# Patient Record
Sex: MALE | Race: BLACK OR AFRICAN AMERICAN | Employment: FULL TIME | URBAN - METROPOLITAN AREA
[De-identification: names, ages, dates, MRNs, and addresses within clinical notes are randomized per-mention and may not be internally consistent; named-entity substitution may affect disease eponyms.]

---

## 2018-06-21 ENCOUNTER — HOSPITAL ENCOUNTER (OUTPATIENT)
Dept: NEUROLOGY | Age: 63
Discharge: HOME | End: 2018-06-21
Attending: FAMILY MEDICINE | Admitting: FAMILY MEDICINE

## 2018-06-21 DIAGNOSIS — G62.9 POLYNEUROPATHY: ICD-10-CM

## 2020-08-10 ENCOUNTER — OFFICE VISIT (OUTPATIENT)
Dept: ORTHOPEDIC SURGERY | Age: 65
End: 2020-08-10
Payer: COMMERCIAL

## 2020-08-10 VITALS
BODY MASS INDEX: 30.8 KG/M2 | HEIGHT: 71 IN | RESPIRATION RATE: 16 BRPM | HEART RATE: 76 BPM | OXYGEN SATURATION: 94 % | WEIGHT: 220 LBS

## 2020-08-10 PROCEDURE — 99242 OFF/OP CONSLTJ NEW/EST SF 20: CPT | Performed by: PHYSICIAN ASSISTANT

## 2020-08-10 PROCEDURE — G8417 CALC BMI ABV UP PARAM F/U: HCPCS | Performed by: PHYSICIAN ASSISTANT

## 2020-08-10 PROCEDURE — G8427 DOCREV CUR MEDS BY ELIG CLIN: HCPCS | Performed by: PHYSICIAN ASSISTANT

## 2020-08-10 ASSESSMENT — ENCOUNTER SYMPTOMS
EYES NEGATIVE: 1
ALLERGIC/IMMUNOLOGIC NEGATIVE: 1
RESPIRATORY NEGATIVE: 1
SHORTNESS OF BREATH: 0
GASTROINTESTINAL NEGATIVE: 1

## 2020-08-10 NOTE — PROGRESS NOTES
Pushpa Gonzalez is a 63year old male. Patient is in the office today with left shoulder pain. Patient states that he injured themselves by probably lifting something heavy. Patient states the pulling feeling in the shoulder 4 months. Helps the most none. Helps the least none. Previous treatment physical therapy with no relief. Previous Injuries none. Previous surgeries none. Pain scale  0/10. 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  Patient works on [employer]

## 2020-08-10 NOTE — PROGRESS NOTES
Cancer Father         Unsure What Kind Of Cancer    Diabetes Sister        Social History     Socioeconomic History    Marital status:      Spouse name: None    Number of children: None    Years of education: None    Highest education level: None   Occupational History    None   Social Needs    Financial resource strain: None    Food insecurity     Worry: None     Inability: None    Transportation needs     Medical: None     Non-medical: None   Tobacco Use    Smoking status: Never Smoker    Smokeless tobacco: Never Used   Substance and Sexual Activity    Alcohol use: No    Drug use: No    Sexual activity: Yes     Partners: Female   Lifestyle    Physical activity     Days per week: None     Minutes per session: None    Stress: None   Relationships    Social connections     Talks on phone: None     Gets together: None     Attends [employer] service: None     Active member of club or organization: None     Attends meetings of clubs or organizations: None     Relationship status: None    Intimate partner violence     Fear of current or ex partner: None     Emotionally abused: None     Physically abused: None     Forced sexual activity: None   Other Topics Concern    None   Social History Narrative    None       Current Outpatient Medications   Medication Sig Dispense Refill    Multiple Vitamins-Minerals (MULTIVITAMIN PO) Take  by mouth every morning. Over The Counter      UNABLE TO FIND Take  by mouth every morning. Over The Counter Protein Supplement      GINKGO BILOBA PO Take  by mouth every other day. Over The Counter      GINSENG PO Take  by mouth every morning. Over The Counter       No current facility-administered medications for this visit. No Known Allergies    Vitals:    08/10/20 1328   Pulse: 76   Resp: 16   SpO2: 94%   Weight: 220 lb (99.8 kg)   Height: 5' 11\" (1.803 m)       Gen/Psych:Examination reveals a pleasant individual in no acute distress.  The patient is oriented to time, place and person. The patient's mood and affect are appropriate. Patient appears well nourished. HEENT: Head is atraumatic normocephalic, ears are symmetric, Eyes show equal pupils bilaterally, extraocular muscles intact. Lymph: no obvious lymphedema in Left upper extremities     Skin intact in bilateral upper extremity with no ulcerations, lesions, rash, erythema. Vascular: There are no varicosities in bilateral upper extremities, sensation intact to light touch over left upper extremities. Left shoulder exam:  Skin:  Clear with no erythema, there is no significant joint effusion. Deformity:  none  Atrophy:  none  Tenderness: Minimal tenderness to palpation over anterolateral shoulder. Active ROM:   Flexion: 0-180 degrees   Extension 0-40 degrees   Internal rotation: 0-80 degrees         External Rotation: 0-90 degrees   Abduction: 0-180 degrees  The patient is able to actively extend, flex, abduct, and adduct with no difficulty. Neer: Positive for mild pain  Sutton:  Positive for mild pain  Empty-can test: Positive for mild pain  Cross-over test: Negative    Cervical Spine tenderness: Nontender to palpation over left side neck and trapezius. Imaging: X-ray of the shoulder, 4 views, were obtained and reviewed today. X-rays showed no glenohumeral joint space narrowing. Very minimal AC joint arthritis may be present. No fractures or dislocations noted. No suspicious lesions noted. The official read and interpretation of these x-rays will be done by the the [facility]     Assessment: Left rotator cuff tendinitis. Plan:  The patient was seen in the clinic today for left shoulder pain. The patient states his pain began 4 months ago but today no longer has any pain. The patient states he came to the clinic to find out what could have caused his pain.   On physical exam, the patient had full range of active motion with very minimal tenderness to palpation over the anterior lateral shoulder. Imaging was reviewed and discussed with the patient which did not show any significant joint space narrowing, fractures, or dislocations. Due to patient having full range of motion and minimal tenderness, MRI did not feel warranted at this time however it was discussed with the patient that if the pain returns that the patient call the clinic and an MRI can be scheduled. Patient was agreeable to this plan. Continue weight bear as tolerated  Continue range of motion and exercises as instruction  Ice and elevate as needed  Tylenol or Motrin for pain  Follow up as needed  If pain returns, call the office and an MRI can be ordered    The patient was also seen and evaluated by my supervising colleague, Carley Ewing PA-C. We discussed the history, physical, and imaging as well as the patient's treatment plan. Please see their notes for further details. *Please note this report has been partially produced using speech recognition Dragon software and may contain errors related to that system including errors in grammar, punctuation, and spelling, as well as words and phrases that may be inappropriate.  If there are any questions or concerns please feel free to contact the dictating provider for clarification

## 2020-08-10 NOTE — PATIENT INSTRUCTIONS
Continue weight bear as tolerated  Continue range of motion and exercises as instruction  Ice and elevate as needed  Tylenol or Motrin for pain  Follow up as needed  If pain returns, call the office and an MRI can be ordered

## 2020-08-11 NOTE — PROGRESS NOTES
This patient was seen in conjunction with FERNANDO Reyes   I reviewed her findings and also examined the patient myself and agree with the findings and have changed findings in appropriate places. Review of Systems   Constitutional: Negative. Negative for unexpected weight change. HENT: Negative. Eyes: Negative. Respiratory: Negative. Negative for shortness of breath. Cardiovascular: Negative. Negative for chest pain. Gastrointestinal: Negative. Genitourinary: Negative. Musculoskeletal: Positive for myalgias. Negative for neck pain. Skin: Negative. Negative for rash and wound. Neurological: Negative. Negative for weakness and numbness. Psychiatric/Behavioral: Negative. HPI:  Verónica Maloney is a 63 y.o. male that complains of a history of left shoulder pain about 4 months ago that left him unable to raise his arm above shoulder level without significant discomfort and weakness. He states that since that time his shoulder has gotten better and he is able to move the arm in all planes of motion with no pain. He has not had a steroid injection in the shoulder and the pain just got better with over-the-counter medications, physical therapy, and time. No prior surgeries on the shoulder. At one point the pain was an 6/10 aching pain but now there is no pain in the shoulder. He also relates his shoulder pain to a job that he was doing which he no longer is doing.     The patient was referred by Neita Canavan, MD  For left shoulder pain  Past Medical History:   Diagnosis Date    HX OTHER MEDICAL     Primary Care Physician Is Dr. Rohan Hodges    Teeth missing     Lower Right    Wears glasses     To Read       Past Surgical History:   Procedure Laterality Date    DENTAL SURGERY      Teeth Extracted In Past    HERNIA REPAIR Right 8/19/14    right inquinal hernia repair    KNEE ARTHROPLASTY Left 2000's       Family History   Problem Relation Age of Onset    Cancer time, place and person. The patient's mood and affect are appropriate. Patient appears well nourished. Body habitus is overweight    Head: Head is atraumatic normocephalic,  ears are symmetric,     Eyes: Eyes show equal pupils bilaterally, extraocular muscles intact    Ears:  Hearing is intact to normal voice at 5 feet    Nose: Nares are patent bilaterally, no epistaxis,no rhinorrhea     Lymph: No obvious lymphedema in bilateral upper extremities     Skin intact in bilateral upper extremities with no ulcerations, lesions, rash, erythema. Vascular: There are no varicosities in bilateral upper  extremities, sensation intact to light touch over bilateral upper extremities. Left shoulder exam:  Skin:  Clear with no erythema, there is no significant joint effusion  Deformity:  none  Atrophy:  none  Tenderness:  none, mild globally  Active ROM:   FE:180    IR side: L2           ER side: 50   Abduction: 180      Passive ROM is the same as active  Strength: FE:5/5     ER:5/5   Neer:  not positive  Sutton:  mildly positive      Cervical Spine tenderness: no    Outside Record review: None    Imagin views the left shoulder taken reviewed in the office today show no fractures, no dislocations, no significant glenohumeral joint arthritis, mild acromioclavicular joint osteoarthritis. The official read and interpretation of these x-rays will be done by the the [facility]     Assessment: Left rotator cuff tendinitis-improved    Plan: At this point given the patient's improvement of symptoms, his lack of physical exam findings and negative x-ray I felt that an MRI would be of little clinical value at this time. Should he have a recurrence of his pain that he is unable to get under control with anti-inflammatory medication and rest then at that point he could call the office and we could consider ordering an MRI of the shoulder looking for a small rotator cuff tear.   I explained to him that given his lack of any weakness or loss of motion at this point it would be extremely unlikely to find anything on MRI that would require any surgical intervention. Take anti-inflammatory medications as needed, avoid heavy overhead lifting and call office if return or worsening pain.

## 2021-06-25 LAB
ALBUMIN SERPL-MCNC: 4 G/DL
ALP BLD-CCNC: 88 U/L
ALT SERPL-CCNC: 15 U/L
ANION GAP SERPL CALCULATED.3IONS-SCNC: 1.3 MMOL/L
AST SERPL-CCNC: 16 U/L
BASOPHILS ABSOLUTE: 0.1 /ΜL
BASOPHILS RELATIVE PERCENT: 1 %
BILIRUB SERPL-MCNC: 0.2 MG/DL (ref 0.1–1.4)
BILIRUBIN, URINE: NEGATIVE
BLOOD, URINE: NEGATIVE
BUN BLDV-MCNC: 16 MG/DL
C-REACTIVE PROTEIN: 1.69
CALCIUM SERPL-MCNC: 9.8 MG/DL
CHLORIDE BLD-SCNC: 105 MMOL/L
CHOLESTEROL, TOTAL: 166 MG/DL
CHOLESTEROL/HDL RATIO: 4.5
CLARITY: CLEAR
CO2: 27 MMOL/L
COLOR: YELLOW
CREAT SERPL-MCNC: 1.03 MG/DL
EOSINOPHILS ABSOLUTE: 0.2 /ΜL
EOSINOPHILS RELATIVE PERCENT: 4 %
FOLATE: 9.7
GFR CALCULATED: 76
GLUCOSE BLD-MCNC: 102 MG/DL
GLUCOSE URINE: NEGATIVE
HCT VFR BLD CALC: 39.5 % (ref 41–53)
HDLC SERPL-MCNC: 37 MG/DL (ref 35–70)
HEMOGLOBIN: 13.5 G/DL (ref 13.5–17.5)
KETONES, URINE: POSITIVE
LDL CHOLESTEROL CALCULATED: 113 MG/DL (ref 0–160)
LEUKOCYTE ESTERASE, URINE: NEGATIVE
LYMPHOCYTES ABSOLUTE: 1.5 /ΜL
LYMPHOCYTES RELATIVE PERCENT: 26 %
MAGNESIUM: 2.4 MG/DL
MCH RBC QN AUTO: 30.7 PG
MCHC RBC AUTO-ENTMCNC: 34.2 G/DL
MCV RBC AUTO: 90 FL
MONOCYTES ABSOLUTE: 0.5 /ΜL
MONOCYTES RELATIVE PERCENT: 8 %
NEUTROPHILS ABSOLUTE: 3.6 /ΜL
NEUTROPHILS RELATIVE PERCENT: 60 %
NITRITE, URINE: NEGATIVE
NONHDLC SERPL-MCNC: ABNORMAL MG/DL
PH UA: 6 (ref 4.5–8)
PLATELET # BLD: 334 K/ΜL
PMV BLD AUTO: ABNORMAL FL
POTASSIUM SERPL-SCNC: 5.7 MMOL/L
PROSTATE SPECIFIC ANTIGEN FREE: NORMAL
PROSTATE SPECIFIC ANTIGEN PERCENT FREE: 20 %
PROTEIN UA: ABNORMAL
PSA-PROSTATE SPECIFIC AG: NORMAL
RBC # BLD: 4.4 10^6/ΜL
SODIUM BLD-SCNC: 143 MMOL/L
SPECIFIC GRAVITY, URINE: 1.03
TOTAL PROTEIN: 7
TRIGL SERPL-MCNC: 84 MG/DL
TSH SERPL DL<=0.05 MIU/L-ACNC: 2.47 UIU/ML
UROBILINOGEN, URINE: NORMAL
VITAMIN B-12: 739
VITAMIN D 25-HYDROXY: 61.7
VITAMIN D2, 25 HYDROXY: NORMAL
VITAMIN D3,25 HYDROXY: NORMAL
VLDLC SERPL CALC-MCNC: 16 MG/DL
WBC # BLD: 5.8 10^3/ML

## 2021-07-16 LAB
BUN BLDV-MCNC: ABNORMAL MG/DL
CALCIUM SERPL-MCNC: ABNORMAL MG/DL
CHLORIDE BLD-SCNC: 107 MMOL/L
CO2: 23 MMOL/L
CREAT SERPL-MCNC: ABNORMAL MG/DL
GFR CALCULATED: ABNORMAL
GLUCOSE BLD-MCNC: ABNORMAL MG/DL
POTASSIUM SERPL-SCNC: 4.3 MMOL/L
SODIUM BLD-SCNC: 142 MMOL/L

## 2021-08-11 ENCOUNTER — TELEPHONE (OUTPATIENT)
Dept: SURGERY | Age: 66
End: 2021-08-11

## 2021-08-11 NOTE — TELEPHONE ENCOUNTER
LEFT MESSAGE TO SET UP APPT FOR PRESSURE AT HERNIA SITE, S/P HERNIA REPAIR 2014 W/ ANDOM.  REF DR Gurwinder Lowe

## 2023-07-13 ENCOUNTER — OFFICE VISIT (OUTPATIENT)
Dept: SURGERY | Age: 68
End: 2023-07-13

## 2023-07-13 VITALS
DIASTOLIC BLOOD PRESSURE: 72 MMHG | OXYGEN SATURATION: 99 % | HEIGHT: 71 IN | SYSTOLIC BLOOD PRESSURE: 110 MMHG | WEIGHT: 207.3 LBS | HEART RATE: 71 BPM | BODY MASS INDEX: 29.02 KG/M2

## 2023-07-13 DIAGNOSIS — K40.90 NON-RECURRENT UNILATERAL INGUINAL HERNIA WITHOUT OBSTRUCTION OR GANGRENE: ICD-10-CM

## 2023-07-13 DIAGNOSIS — Z12.11 SCREEN FOR COLON CANCER: Primary | ICD-10-CM

## 2023-07-13 RX ORDER — SODIUM, POTASSIUM,MAG SULFATES 17.5-3.13G
2 SOLUTION, RECONSTITUTED, ORAL ORAL ONCE
Qty: 1 EACH | Refills: 0 | Status: SHIPPED | OUTPATIENT
Start: 2023-07-13 | End: 2023-07-13

## 2023-07-13 ASSESSMENT — PATIENT HEALTH QUESTIONNAIRE - PHQ9
2. FEELING DOWN, DEPRESSED OR HOPELESS: 0
SUM OF ALL RESPONSES TO PHQ9 QUESTIONS 1 & 2: 0
1. LITTLE INTEREST OR PLEASURE IN DOING THINGS: 0
SUM OF ALL RESPONSES TO PHQ QUESTIONS 1-9: 0

## 2023-07-13 NOTE — PROGRESS NOTES
surendra  Chief Complaint   Patient presents with    New Patient     NP- RT ING Hernia        SUBJECTIVE:  HPI: Pt presents today for evaluation and in need of colonoscopy. Pt has had colonoscopy in the past.  Patient has been experiencing no bleeding. Denies wt loss. There is no personal history of colon cancer. There is no family history of colon cancer. Pt also has left inguinal hernia with poss recurrent right inguinal pain. I have reviewed the patient's(pertinent information to this visit) medical history, family history(scanned in  the Media tab under \"patient questioner\"), social history and review ofsystems with the patient today in the office.          Past Surgical History:   Procedure Laterality Date    DENTAL SURGERY      Teeth Extracted In Past    HERNIA REPAIR Right 8/19/14    right inquinal hernia repair    KNEE ARTHROPLASTY Left 2000's     Past Medical History:   Diagnosis Date    HX OTHER MEDICAL     Primary Care Physician Is Dr. Aidan Castillo    Teeth missing     Lower Right    Wears glasses     To Read     Family History   Problem Relation Age of Onset    Cancer Father         Unsure What Kind Of Cancer    Diabetes Sister      Social History     Socioeconomic History    Marital status:      Spouse name: Not on file    Number of children: Not on file    Years of education: Not on file    Highest education level: Not on file   Occupational History    Not on file   Tobacco Use    Smoking status: Never    Smokeless tobacco: Never   Substance and Sexual Activity    Alcohol use: No    Drug use: No    Sexual activity: Yes     Partners: Female   Other Topics Concern    Not on file   Social History Narrative    Not on file     Social Determinants of Health     Financial Resource Strain: Not on file   Food Insecurity: Not on file   Transportation Needs: Not on file   Physical Activity: Not on file   Stress: Not on file   Social Connections: Not on file   Intimate Partner Violence: Not on file

## 2023-07-15 ASSESSMENT — ENCOUNTER SYMPTOMS
APNEA: 0
ANAL BLEEDING: 0
EYE ITCHING: 0
BACK PAIN: 0
COLOR CHANGE: 0
CONSTIPATION: 0
ABDOMINAL PAIN: 1
EYE REDNESS: 0
STRIDOR: 0
RECTAL PAIN: 0
SORE THROAT: 0
PHOTOPHOBIA: 0
CHOKING: 0

## 2023-07-20 ENCOUNTER — TELEPHONE (OUTPATIENT)
Dept: SURGERY | Age: 68
End: 2023-07-20

## 2023-07-20 NOTE — TELEPHONE ENCOUNTER
LEFT MESSAGE FOR Drake Montanez REGARDING SURGERY ([facility]) SCHEDULED @ [facility].  NOTIFIED OF DATES, TIMES AND LOCATION    PHONE ASSESSMENT   SURGERY - 8/2/23 @ 845  P/O - 8/10/23 @ 200    NPO AFTER MIDNIGHT  SUPREP  1ST BOTTLE 4PM  2ND BOTTLE 4AM  HOLD BLOOD THINNERS - NA

## 2023-07-27 ENCOUNTER — TELEPHONE (OUTPATIENT)
Dept: SURGERY | Age: 68
End: 2023-07-27

## 2023-12-28 ENCOUNTER — OFFICE VISIT (OUTPATIENT)
Dept: SURGERY | Age: 68
End: 2023-12-28
Payer: COMMERCIAL

## 2023-12-28 VITALS
SYSTOLIC BLOOD PRESSURE: 108 MMHG | WEIGHT: 209.6 LBS | HEIGHT: 71 IN | DIASTOLIC BLOOD PRESSURE: 66 MMHG | HEART RATE: 74 BPM | BODY MASS INDEX: 29.34 KG/M2 | OXYGEN SATURATION: 98 %

## 2023-12-28 DIAGNOSIS — R10.31 RIGHT INGUINAL PAIN: Primary | ICD-10-CM

## 2023-12-28 PROCEDURE — G8419 CALC BMI OUT NRM PARAM NOF/U: HCPCS | Performed by: SURGERY

## 2023-12-28 PROCEDURE — 99214 OFFICE O/P EST MOD 30 MIN: CPT | Performed by: SURGERY

## 2023-12-28 PROCEDURE — G8484 FLU IMMUNIZE NO ADMIN: HCPCS | Performed by: SURGERY

## 2023-12-28 PROCEDURE — G8427 DOCREV CUR MEDS BY ELIG CLIN: HCPCS | Performed by: SURGERY

## 2023-12-28 PROCEDURE — 3017F COLORECTAL CA SCREEN DOC REV: CPT | Performed by: SURGERY

## 2023-12-28 PROCEDURE — 1036F TOBACCO NON-USER: CPT | Performed by: SURGERY

## 2023-12-28 PROCEDURE — 1123F ACP DISCUSS/DSCN MKR DOCD: CPT | Performed by: SURGERY

## 2023-12-28 ASSESSMENT — PATIENT HEALTH QUESTIONNAIRE - PHQ9
SUM OF ALL RESPONSES TO PHQ QUESTIONS 1-9: 0
2. FEELING DOWN, DEPRESSED OR HOPELESS: 0
SUM OF ALL RESPONSES TO PHQ QUESTIONS 1-9: 0
1. LITTLE INTEREST OR PLEASURE IN DOING THINGS: 0
SUM OF ALL RESPONSES TO PHQ QUESTIONS 1-9: 0
SUM OF ALL RESPONSES TO PHQ9 QUESTIONS 1 & 2: 0
SUM OF ALL RESPONSES TO PHQ QUESTIONS 1-9: 0

## 2023-12-28 NOTE — PROGRESS NOTES
surendra  Chief Complaint   Patient presents with    New Patient     NP- RT ING Hernia        SUBJECTIVE:  HPI: Pt presents today for evaluation and in need of colonoscopy. Pt has had colonoscopy in the past.  Patient has been experiencing no bleeding. Denies wt loss. There is no personal history of colon cancer.There is no family history of colon cancer. Pt also has left inguinal hernia with poss recurrent right inguinal pain.     I have reviewed the patient's(pertinent information to this visit) medical history, family history(scanned in  the Media tab under \"patient questioner\"), social history and review ofsystems with the patient today in the office.         Past Surgical History:   Procedure Laterality Date    DENTAL SURGERY      Teeth Extracted In Past    HERNIA REPAIR Right 8/19/14    right inquinal hernia repair    KNEE ARTHROPLASTY Left 2000's     Past Medical History:   Diagnosis Date    HX OTHER MEDICAL     Primary Care Physician Is Dr. Praveen Dougherty    Teeth missing     Lower Right    Wears glasses     To Read     Family History   Problem Relation Age of Onset    Cancer Father         Unsure What Kind Of Cancer    Diabetes Sister      Social History     Socioeconomic History    Marital status:      Spouse name: Not on file    Number of children: Not on file    Years of education: Not on file    Highest education level: Not on file   Occupational History    Not on file   Tobacco Use    Smoking status: Never    Smokeless tobacco: Never   Substance and Sexual Activity    Alcohol use: No    Drug use: No    Sexual activity: Yes     Partners: Female   Other Topics Concern    Not on file   Social History Narrative    Not on file     Social Determinants of Health     Financial Resource Strain: Not on file   Food Insecurity: Not on file   Transportation Needs: Not on file   Physical Activity: Not on file   Stress: Not on file   Social Connections: Not on file   Intimate Partner Violence: Not on file

## 2024-01-12 ENCOUNTER — HOSPITAL ENCOUNTER (OUTPATIENT)
Dept: CT IMAGING | Age: 69
Discharge: HOME | End: 2024-01-12
Attending: SURGERY
Payer: COMMERCIAL

## 2024-01-12 DIAGNOSIS — R10.31 RIGHT INGUINAL PAIN: ICD-10-CM

## 2024-01-12 PROCEDURE — 74176 CT ABD & PELVIS W/O CONTRAST: CPT

## 2024-01-26 ENCOUNTER — HOSPITAL ENCOUNTER (EMERGENCY)
Age: 69
Discharge: HOME | End: 2024-01-26
Attending: EMERGENCY MEDICINE
Payer: COMMERCIAL

## 2024-01-26 VITALS
HEART RATE: 60 BPM | RESPIRATION RATE: 14 BRPM | HEIGHT: 71 IN | BODY MASS INDEX: 29.26 KG/M2 | OXYGEN SATURATION: 96 % | SYSTOLIC BLOOD PRESSURE: 127 MMHG | WEIGHT: 209 LBS | TEMPERATURE: 97.4 F | DIASTOLIC BLOOD PRESSURE: 76 MMHG

## 2024-01-26 DIAGNOSIS — B34.9 VIRAL ILLNESS: Primary | ICD-10-CM

## 2024-01-26 LAB
INFLUENZA A ANTIGEN: NOT DETECTED
INFLUENZA B ANTIGEN: NOT DETECTED
SARS-COV-2 RDRP RESP QL NAA+PROBE: NOT DETECTED
SOURCE: NORMAL

## 2024-01-26 PROCEDURE — 87430 STREP A AG IA: CPT

## 2024-01-26 PROCEDURE — 87502 INFLUENZA DNA AMP PROBE: CPT

## 2024-01-26 PROCEDURE — 87635 SARS-COV-2 COVID-19 AMP PRB: CPT

## 2024-01-26 PROCEDURE — 87081 CULTURE SCREEN ONLY: CPT

## 2024-01-26 PROCEDURE — 99283 EMERGENCY DEPT VISIT LOW MDM: CPT

## 2024-01-26 ASSESSMENT — LIFESTYLE VARIABLES
HOW OFTEN DO YOU HAVE A DRINK CONTAINING ALCOHOL: NEVER
HOW MANY STANDARD DRINKS CONTAINING ALCOHOL DO YOU HAVE ON A TYPICAL DAY: PATIENT DOES NOT DRINK

## 2024-01-26 NOTE — DISCHARGE INSTRUCTIONS
You should be able to return to work with no restrictions  If having persistent shortness of breath return to ER

## 2024-01-26 NOTE — ED PROVIDER NOTES
symptoms are improving and in fact he was planning to go back to work but he was told he needs to get medical clearance before he can come back to work.  The overall clinical picture is consistent with acute viral illness.  COVID-19 and influenza test obtained.  No need for chest x-ray as patient has no debilitating cough or shortness of breath or fever.  COVID-19 and influenza test results are negative.  Patient is reassured and allowed to return to work with no restrictions.  Should he develop persistent shortness of breath he is advised to return to ER.    Clinical Impression:  1. Viral illness      Disposition referral (if applicable):  No follow-up provider specified.  Disposition medications (if applicable):  New Prescriptions    No medications on file     ED Provider Disposition Time  DISPOSITION Decision To Discharge 01/26/2024 11:10:17 AM      Comment: Please note this report has been produced using speech recognition software and may contain errors related to that system including errors in grammar, punctuation, and spelling, as well as words and phrases that may be inappropriate.  Efforts were made to edit the dictations.       Sachin Riley MD  01/26/24 1111

## 2024-01-27 LAB
CULTURE: NORMAL
Lab: NORMAL
SPECIMEN: NORMAL
STREP A DIRECT SCREEN: NEGATIVE

## 2024-01-29 LAB
CULTURE: NORMAL
Lab: NORMAL
SPECIMEN: NORMAL
STREP A DIRECT SCREEN: NEGATIVE

## 2024-02-05 ENCOUNTER — HOSPITAL ENCOUNTER (EMERGENCY)
Age: 69
Discharge: HOME | End: 2024-02-05
Payer: COMMERCIAL

## 2024-02-05 VITALS
SYSTOLIC BLOOD PRESSURE: 139 MMHG | HEART RATE: 66 BPM | DIASTOLIC BLOOD PRESSURE: 82 MMHG | RESPIRATION RATE: 16 BRPM | OXYGEN SATURATION: 98 % | TEMPERATURE: 98.5 F

## 2024-02-05 DIAGNOSIS — J06.9 VIRAL UPPER RESPIRATORY TRACT INFECTION: Primary | ICD-10-CM

## 2024-02-05 PROCEDURE — 87635 SARS-COV-2 COVID-19 AMP PRB: CPT

## 2024-02-05 PROCEDURE — 99283 EMERGENCY DEPT VISIT LOW MDM: CPT

## 2024-02-05 PROCEDURE — 87502 INFLUENZA DNA AMP PROBE: CPT

## 2024-02-05 PROCEDURE — 87081 CULTURE SCREEN ONLY: CPT

## 2024-02-05 PROCEDURE — 87430 STREP A AG IA: CPT

## 2024-02-05 RX ORDER — IBUPROFEN 600 MG/1
600 TABLET ORAL 3 TIMES DAILY PRN
Qty: 30 TABLET | Refills: 0 | Status: SHIPPED | OUTPATIENT
Start: 2024-02-05

## 2024-02-05 RX ORDER — ALBUTEROL SULFATE 90 UG/1
2 AEROSOL, METERED RESPIRATORY (INHALATION) 4 TIMES DAILY PRN
Qty: 54 G | Refills: 1 | Status: SHIPPED | OUTPATIENT
Start: 2024-02-05

## 2024-02-05 RX ORDER — DEXTROMETHORPHAN HYDROBROMIDE, GUAIFENESIN AND PSEUDOEPHEDRINE HYDROCHLORIDE 15; 400; 60 MG/1; MG/1; MG/1
1 TABLET ORAL EVERY 6 HOURS
Qty: 20 TABLET | Refills: 0 | Status: SHIPPED | OUTPATIENT
Start: 2024-02-05 | End: 2024-02-10

## 2024-02-05 NOTE — ED PROVIDER NOTES
[facility] EMERGENCY DEPARTMENT  EMERGENCY DEPARTMENT ENCOUNTER        Pt Name: Drake Montanez  MRN: [id number]  Birthdate 1955  Date of evaluation: 2/5/2024  Provider: NIKO JONAS - CNP  PCP: Praveen Dougherty MD    AYSHA. I have evaluated this patient.        Triage CHIEF COMPLAINT       Chief Complaint   Patient presents with    Pharyngitis    Chest Congestion    Cough    Generalized Body Aches     States body aches, cough, sore throat and chest congestion been going on for a couple days         HISTORY OF PRESENT ILLNESS      Chief Complaint: Cough, congestion    Drake Montanez is a 69 y.o. male who presents cough, congestion, body aches, headache, sore throat over the last 3 to 4 days.  Denies any fevers or chills, nausea, vomiting, diarrhea, chest pain or shortness of breath, abdominal pain.  Has not taken any medication at home.  States he went to work today and just felt too terrible to work and thus came in for evaluation.    Nursing Notes were all reviewed and agreed with or any disagreements were addressed in the HPI.    REVIEW OF SYSTEMS     Pertinent ROS as noted in HPI.      PAST MEDICAL HISTORY     Past Medical History:   Diagnosis Date    HX OTHER MEDICAL     Primary Care Physician Is Dr. Praveen Dougherty    Teeth missing     Lower Right    Wears glasses     To Read       SURGICAL HISTORY     Past Surgical History:   Procedure Laterality Date    DENTAL SURGERY      Teeth Extracted In Past    HERNIA REPAIR Right 8/19/14    right inquinal hernia repair    KNEE ARTHROPLASTY Left 2000's       CURRENTMEDICATIONS       Discharge Medication List as of 2/5/2024  2:08 PM        CONTINUE these medications which have NOT CHANGED    Details   Multiple Vitamins-Minerals (MULTIVITAMIN PO) Take  by mouth every morning. Over The CounterHistorical Med      UNABLE TO FIND Take  by mouth every morning. Over The Counter Protein SupplementHistorical Med

## 2024-02-07 LAB
CULTURE: NORMAL
Lab: NORMAL
SPECIMEN: NORMAL
STREP A DIRECT SCREEN: NEGATIVE

## 2024-03-14 ENCOUNTER — OFFICE VISIT (OUTPATIENT)
Dept: SURGERY | Age: 69
End: 2024-03-14
Payer: COMMERCIAL

## 2024-03-14 VITALS
HEIGHT: 71 IN | DIASTOLIC BLOOD PRESSURE: 70 MMHG | WEIGHT: 205.2 LBS | HEART RATE: 80 BPM | SYSTOLIC BLOOD PRESSURE: 104 MMHG | BODY MASS INDEX: 28.73 KG/M2 | OXYGEN SATURATION: 93 %

## 2024-03-14 DIAGNOSIS — K40.90 NON-RECURRENT UNILATERAL INGUINAL HERNIA WITHOUT OBSTRUCTION OR GANGRENE: Primary | ICD-10-CM

## 2024-03-14 PROCEDURE — 99214 OFFICE O/P EST MOD 30 MIN: CPT | Performed by: SURGERY

## 2024-03-14 PROCEDURE — 1123F ACP DISCUSS/DSCN MKR DOCD: CPT | Performed by: SURGERY

## 2024-03-14 RX ORDER — PENICILLIN V POTASSIUM 500 MG/1
500 TABLET ORAL 4 TIMES DAILY
COMMUNITY
Start: 2024-03-13

## 2024-03-14 RX ORDER — CHLORHEXIDINE GLUCONATE ORAL RINSE 1.2 MG/ML
SOLUTION DENTAL
COMMUNITY
Start: 2024-03-13

## 2024-03-14 RX ORDER — EPHEDRINE SULFATE 25 MG/1
1 TABLET, COATED ORAL EVERY 4 HOURS PRN
COMMUNITY
Start: 2024-02-07

## 2024-03-14 RX ORDER — IBUPROFEN 800 MG/1
800 TABLET ORAL EVERY 6 HOURS PRN
COMMUNITY
Start: 2024-03-13

## 2024-04-29 ENCOUNTER — TELEPHONE (OUTPATIENT)
Dept: SURGERY | Age: 69
End: 2024-04-29

## 2024-05-01 ENCOUNTER — PREP FOR PROCEDURE (OUTPATIENT)
Dept: SURGERY | Age: 69
End: 2024-05-01

## 2024-05-01 PROBLEM — K40.90 INGUINAL HERNIA: Status: ACTIVE | Noted: 2024-05-01

## 2024-05-06 ENCOUNTER — TELEPHONE (OUTPATIENT)
Dept: SURGERY | Age: 69
End: 2024-05-06

## 2024-05-06 NOTE — TELEPHONE ENCOUNTER
LEFT MESSAGE  Drake Montanez REGARDING SURGERY ([facility] HERNIA REPAIR) SCHEDULED @ [facility]. NOTIFIED OF DATES, TIMES AND LOCATION    PHONE ASSESSMENT   SURGERY - 5/21/24 @ 7:30 AM  P/O - 5/30/24 @ 12PM    NPO AFTER MIDNIGHT    HOLD BLOOD THINNERS - NA

## 2024-05-15 NOTE — PROGRESS NOTES
Left second VM with callback number, please call PAT nurse for assessment and surgery instructions, physician office notified

## 2024-05-20 ENCOUNTER — ANESTHESIA EVENT (OUTPATIENT)
Dept: OPERATING ROOM | Age: 69
End: 2024-05-20
Payer: COMMERCIAL

## 2024-05-20 NOTE — PROGRESS NOTES
.Surgery @ [facility] on 5/21/24 0730 arrival 0600    NOTHING TO EAT OR DRINK AFTER MIDNIGHT DAY OF SURGERY    1. Enter thru the hospital main entrance on day of surgery, check in at the Information Desk. If you arrive prior to 6:00am, enter thru the ER entrance.    2. Follow the directions as prescribed by the doctor for your procedure and medications.         Morning of surgery take: no medications          Stop vitamins, supplements and NSAIDS:  today     3. Check with your Doctor regarding stopping blood thinners and follow their instructions.    4. Do not smoke, vape or use chewing tobacco morning of surgery. Do not drink any alcoholic beverages 24 hours prior to surgery.       This includes NA Beer. No street drugs 7 days prior to surgery.    5. If you have dentures, contacts of glasses they will be removed before going to the OR; please bring a case.    6. Please bring picture ID, insurance card, paperwork from the doctor’s office (H & P, Consent, & card for implantable devices).    7. Take a shower with an antibacterial soap the night before surgery and the morning of surgery. Do not put anything on your skin      After your morning shower.    8. You will need a responsible adult to drive you home and check on you after surgery.

## 2024-05-20 NOTE — ANESTHESIA PRE PROCEDURE
Department of Anesthesiology  Preprocedure Note       Name:  Drake Montanez   Age:  69 y.o.  :  1955                                          MRN:  [id number]         Date:  2024      Surgeon: Surgeon(s):  Yajaira Garcia MD    Procedure: Procedure(s):  HERNIA INGUINAL REPAIR LAPAROSCOPIC ROBOTIC    Medications prior to admission:   Prior to Admission medications    Medication Sig Start Date End Date Taking? Authorizing Provider   chlorhexidine (PERIDEX) 0.12 % solution  3/13/24   Jeniffer Young MD   BRONKAID MAX 25 MG TABS Take 1 tablet by mouth every 4 hours as needed  Patient not taking: Reported on 3/14/2024 2/7/24   Jeniffer Young MD   penicillin v potassium (VEETID) 500 MG tablet Take 1 tablet by mouth 4 times daily 3/13/24   Jeniffer Young MD   ibuprofen (ADVIL;MOTRIN) 800 MG tablet Take 1 tablet by mouth every 6 hours as needed 3/13/24   Jeniffer Young MD   albuterol sulfate HFA (VENTOLIN HFA) 108 (90 Base) MCG/ACT inhaler Inhale 2 puffs into the lungs 4 times daily as needed for Wheezing  Patient not taking: Reported on 3/14/2024 2/5/24   Missy Paige, APRN - CNP   Multiple Vitamins-Minerals (MULTIVITAMIN PO) Take  by mouth every morning. Over The Counter    Provider, MD Jeniffer   UNABLE TO FIND Take  by mouth every morning. Over The Counter Protein Supplement    Provider, MD Jeniffer   GINKGO BILOBA PO Take  by mouth every other day. Over The Counter    Provider, MD Jeniffer       Current medications:    No current facility-administered medications for this encounter.     Current Outpatient Medications   Medication Sig Dispense Refill   • chlorhexidine (PERIDEX) 0.12 % solution      • BRONKAID MAX 25 MG TABS Take 1 tablet by mouth every 4 hours as needed (Patient not taking: Reported on 3/14/2024)     • penicillin v potassium (VEETID) 500 MG tablet Take 1 tablet by mouth 4 times daily     • ibuprofen (ADVIL;MOTRIN) 800 MG tablet Take 1 tablet by

## 2024-05-21 ENCOUNTER — ANESTHESIA (OUTPATIENT)
Dept: OPERATING ROOM | Age: 69
End: 2024-05-21
Payer: COMMERCIAL

## 2024-05-21 ENCOUNTER — HOSPITAL ENCOUNTER (OUTPATIENT)
Age: 69
Setting detail: OUTPATIENT SURGERY
Discharge: HOME | End: 2024-05-21
Attending: SURGERY | Admitting: SURGERY
Payer: COMMERCIAL

## 2024-05-21 VITALS
DIASTOLIC BLOOD PRESSURE: 72 MMHG | HEIGHT: 71 IN | RESPIRATION RATE: 16 BRPM | HEART RATE: 80 BPM | SYSTOLIC BLOOD PRESSURE: 120 MMHG | OXYGEN SATURATION: 92 % | BODY MASS INDEX: 28.42 KG/M2 | TEMPERATURE: 97.2 F | WEIGHT: 203 LBS

## 2024-05-21 DIAGNOSIS — K40.90 NON-RECURRENT UNILATERAL INGUINAL HERNIA WITHOUT OBSTRUCTION OR GANGRENE: Primary | ICD-10-CM

## 2024-05-21 LAB
BASOPHILS ABSOLUTE: 0.1 K/CU MM
BASOPHILS RELATIVE PERCENT: 0.8 % (ref 0–1)
DIFFERENTIAL TYPE: ABNORMAL
EOSINOPHILS ABSOLUTE: 0.3 K/CU MM
EOSINOPHILS RELATIVE PERCENT: 5.4 % (ref 0–3)
HCT VFR BLD CALC: 37.7 % (ref 42–52)
HEMOGLOBIN: 12.3 GM/DL (ref 13.5–18)
IMMATURE NEUTROPHIL %: 0.3 % (ref 0–0.43)
LYMPHOCYTES ABSOLUTE: 1.6 K/CU MM
LYMPHOCYTES RELATIVE PERCENT: 27.3 % (ref 24–44)
MCH RBC QN AUTO: 31.1 PG (ref 27–31)
MCHC RBC AUTO-ENTMCNC: 32.6 % (ref 32–36)
MCV RBC AUTO: 95.4 FL (ref 78–100)
MONOCYTES ABSOLUTE: 0.6 K/CU MM
MONOCYTES RELATIVE PERCENT: 10.1 % (ref 0–4)
NEUTROPHILS ABSOLUTE: 3.3 K/CU MM
NEUTROPHILS RELATIVE PERCENT: 56.1 % (ref 36–66)
NUCLEATED RBC %: 0 %
PDW BLD-RTO: 12.8 % (ref 11.7–14.9)
PLATELET # BLD: 260 K/CU MM (ref 140–440)
PMV BLD AUTO: 9 FL (ref 7.5–11.1)
RBC # BLD: 3.95 M/CU MM (ref 4.6–6.2)
TOTAL IMMATURE NEUTOROPHIL: 0.02 K/CU MM
TOTAL NUCLEATED RBC: 0 K/CU MM
WBC # BLD: 5.9 K/CU MM (ref 4–10.5)

## 2024-05-21 PROCEDURE — 2580000003 HC RX 258: Performed by: PHYSICIAN ASSISTANT

## 2024-05-21 PROCEDURE — 85025 COMPLETE CBC W/AUTO DIFF WBC: CPT

## 2024-05-21 PROCEDURE — 2709999900 HC NON-CHARGEABLE SUPPLY: Performed by: SURGERY

## 2024-05-21 PROCEDURE — S2900 ROBOTIC SURGICAL SYSTEM: HCPCS | Performed by: SURGERY

## 2024-05-21 PROCEDURE — 6360000002 HC RX W HCPCS: Performed by: NURSE ANESTHETIST, CERTIFIED REGISTERED

## 2024-05-21 PROCEDURE — 3700000000 HC ANESTHESIA ATTENDED CARE: Performed by: SURGERY

## 2024-05-21 PROCEDURE — 7100000011 HC PHASE II RECOVERY - ADDTL 15 MIN: Performed by: SURGERY

## 2024-05-21 PROCEDURE — 3700000001 HC ADD 15 MINUTES (ANESTHESIA): Performed by: SURGERY

## 2024-05-21 PROCEDURE — 6360000002 HC RX W HCPCS: Performed by: PHYSICIAN ASSISTANT

## 2024-05-21 PROCEDURE — 7100000000 HC PACU RECOVERY - FIRST 15 MIN: Performed by: SURGERY

## 2024-05-21 PROCEDURE — 6360000002 HC RX W HCPCS: Performed by: SURGERY

## 2024-05-21 PROCEDURE — 49650 LAP ING HERNIA REPAIR INIT: CPT | Performed by: SURGERY

## 2024-05-21 PROCEDURE — 7100000001 HC PACU RECOVERY - ADDTL 15 MIN: Performed by: SURGERY

## 2024-05-21 PROCEDURE — 2580000003 HC RX 258: Performed by: ANESTHESIOLOGY

## 2024-05-21 PROCEDURE — 3600000009 HC SURGERY ROBOT BASE: Performed by: SURGERY

## 2024-05-21 PROCEDURE — 2500000003 HC RX 250 WO HCPCS: Performed by: NURSE ANESTHETIST, CERTIFIED REGISTERED

## 2024-05-21 PROCEDURE — 7100000010 HC PHASE II RECOVERY - FIRST 15 MIN: Performed by: SURGERY

## 2024-05-21 PROCEDURE — 3600000019 HC SURGERY ROBOT ADDTL 15MIN: Performed by: SURGERY

## 2024-05-21 PROCEDURE — 93005 ELECTROCARDIOGRAM TRACING: CPT | Performed by: ANESTHESIOLOGY

## 2024-05-21 PROCEDURE — 6370000000 HC RX 637 (ALT 250 FOR IP): Performed by: ANESTHESIOLOGY

## 2024-05-21 RX ORDER — ONDANSETRON 2 MG/ML
INJECTION INTRAMUSCULAR; INTRAVENOUS PRN
Status: DISCONTINUED | OUTPATIENT
Start: 2024-05-21 | End: 2024-05-21 | Stop reason: SDUPTHER

## 2024-05-21 RX ORDER — NALOXONE HYDROCHLORIDE 0.4 MG/ML
INJECTION, SOLUTION INTRAMUSCULAR; INTRAVENOUS; SUBCUTANEOUS PRN
Status: DISCONTINUED | OUTPATIENT
Start: 2024-05-21 | End: 2024-05-21 | Stop reason: HOSPADM

## 2024-05-21 RX ORDER — HYDROCODONE BITARTRATE AND ACETAMINOPHEN 5; 325 MG/1; MG/1
1 TABLET ORAL EVERY 6 HOURS PRN
Qty: 10 TABLET | Refills: 0 | Status: SHIPPED | OUTPATIENT
Start: 2024-05-21 | End: 2024-05-24

## 2024-05-21 RX ORDER — LIDOCAINE HYDROCHLORIDE 20 MG/ML
INJECTION, SOLUTION INTRAVENOUS PRN
Status: DISCONTINUED | OUTPATIENT
Start: 2024-05-21 | End: 2024-05-21 | Stop reason: SDUPTHER

## 2024-05-21 RX ORDER — OXYCODONE HYDROCHLORIDE 5 MG/1
5 TABLET ORAL
Status: COMPLETED | OUTPATIENT
Start: 2024-05-21 | End: 2024-05-21

## 2024-05-21 RX ORDER — ROCURONIUM BROMIDE 10 MG/ML
INJECTION, SOLUTION INTRAVENOUS PRN
Status: DISCONTINUED | OUTPATIENT
Start: 2024-05-21 | End: 2024-05-21 | Stop reason: SDUPTHER

## 2024-05-21 RX ORDER — ONDANSETRON 2 MG/ML
4 INJECTION INTRAMUSCULAR; INTRAVENOUS
Status: DISCONTINUED | OUTPATIENT
Start: 2024-05-21 | End: 2024-05-21 | Stop reason: HOSPADM

## 2024-05-21 RX ORDER — DIPHENHYDRAMINE HYDROCHLORIDE 50 MG/ML
12.5 INJECTION INTRAMUSCULAR; INTRAVENOUS
Status: DISCONTINUED | OUTPATIENT
Start: 2024-05-21 | End: 2024-05-21 | Stop reason: HOSPADM

## 2024-05-21 RX ORDER — SODIUM CHLORIDE 0.9 % (FLUSH) 0.9 %
5-40 SYRINGE (ML) INJECTION PRN
Status: DISCONTINUED | OUTPATIENT
Start: 2024-05-21 | End: 2024-05-21 | Stop reason: HOSPADM

## 2024-05-21 RX ORDER — SODIUM CHLORIDE 0.9 % (FLUSH) 0.9 %
5-40 SYRINGE (ML) INJECTION EVERY 12 HOURS SCHEDULED
Status: DISCONTINUED | OUTPATIENT
Start: 2024-05-21 | End: 2024-05-21 | Stop reason: HOSPADM

## 2024-05-21 RX ORDER — BUPIVACAINE HYDROCHLORIDE 5 MG/ML
INJECTION, SOLUTION EPIDURAL; INTRACAUDAL
Status: COMPLETED | OUTPATIENT
Start: 2024-05-21 | End: 2024-05-21

## 2024-05-21 RX ORDER — LABETALOL HYDROCHLORIDE 5 MG/ML
10 INJECTION, SOLUTION INTRAVENOUS
Status: DISCONTINUED | OUTPATIENT
Start: 2024-05-21 | End: 2024-05-21 | Stop reason: HOSPADM

## 2024-05-21 RX ORDER — DROPERIDOL 2.5 MG/ML
0.62 INJECTION, SOLUTION INTRAMUSCULAR; INTRAVENOUS EVERY 10 MIN PRN
Status: DISCONTINUED | OUTPATIENT
Start: 2024-05-21 | End: 2024-05-21 | Stop reason: HOSPADM

## 2024-05-21 RX ORDER — DEXAMETHASONE SODIUM PHOSPHATE 4 MG/ML
INJECTION, SOLUTION INTRA-ARTICULAR; INTRALESIONAL; INTRAMUSCULAR; INTRAVENOUS; SOFT TISSUE PRN
Status: DISCONTINUED | OUTPATIENT
Start: 2024-05-21 | End: 2024-05-21 | Stop reason: SDUPTHER

## 2024-05-21 RX ORDER — SODIUM CHLORIDE 9 MG/ML
INJECTION, SOLUTION INTRAVENOUS PRN
Status: DISCONTINUED | OUTPATIENT
Start: 2024-05-21 | End: 2024-05-21 | Stop reason: HOSPADM

## 2024-05-21 RX ORDER — SODIUM CHLORIDE, SODIUM LACTATE, POTASSIUM CHLORIDE, CALCIUM CHLORIDE 600; 310; 30; 20 MG/100ML; MG/100ML; MG/100ML; MG/100ML
INJECTION, SOLUTION INTRAVENOUS ONCE
Status: DISCONTINUED | OUTPATIENT
Start: 2024-05-21 | End: 2024-05-21 | Stop reason: HOSPADM

## 2024-05-21 RX ORDER — PROPOFOL 10 MG/ML
INJECTION, EMULSION INTRAVENOUS PRN
Status: DISCONTINUED | OUTPATIENT
Start: 2024-05-21 | End: 2024-05-21 | Stop reason: SDUPTHER

## 2024-05-21 RX ORDER — SODIUM CHLORIDE, SODIUM LACTATE, POTASSIUM CHLORIDE, CALCIUM CHLORIDE 600; 310; 30; 20 MG/100ML; MG/100ML; MG/100ML; MG/100ML
INJECTION, SOLUTION INTRAVENOUS CONTINUOUS
Status: DISCONTINUED | OUTPATIENT
Start: 2024-05-21 | End: 2024-05-21 | Stop reason: HOSPADM

## 2024-05-21 RX ORDER — HYDRALAZINE HYDROCHLORIDE 20 MG/ML
10 INJECTION INTRAMUSCULAR; INTRAVENOUS
Status: DISCONTINUED | OUTPATIENT
Start: 2024-05-21 | End: 2024-05-21 | Stop reason: HOSPADM

## 2024-05-21 RX ORDER — MIDAZOLAM HYDROCHLORIDE 1 MG/ML
INJECTION INTRAMUSCULAR; INTRAVENOUS PRN
Status: DISCONTINUED | OUTPATIENT
Start: 2024-05-21 | End: 2024-05-21 | Stop reason: SDUPTHER

## 2024-05-21 RX ORDER — FENTANYL CITRATE 50 UG/ML
INJECTION, SOLUTION INTRAMUSCULAR; INTRAVENOUS PRN
Status: DISCONTINUED | OUTPATIENT
Start: 2024-05-21 | End: 2024-05-21 | Stop reason: SDUPTHER

## 2024-05-21 RX ORDER — FENTANYL CITRATE 50 UG/ML
25 INJECTION, SOLUTION INTRAMUSCULAR; INTRAVENOUS EVERY 5 MIN PRN
Status: DISCONTINUED | OUTPATIENT
Start: 2024-05-21 | End: 2024-05-21 | Stop reason: HOSPADM

## 2024-05-21 RX ADMIN — ROCURONIUM BROMIDE 20 MG: 10 INJECTION INTRAVENOUS at 08:38

## 2024-05-21 RX ADMIN — FENTANYL CITRATE 50 MCG: 50 INJECTION, SOLUTION INTRAMUSCULAR; INTRAVENOUS at 07:55

## 2024-05-21 RX ADMIN — SODIUM CHLORIDE, POTASSIUM CHLORIDE, SODIUM LACTATE AND CALCIUM CHLORIDE: 600; 310; 30; 20 INJECTION, SOLUTION INTRAVENOUS at 07:47

## 2024-05-21 RX ADMIN — PROPOFOL 160 MG: 10 INJECTION, EMULSION INTRAVENOUS at 07:57

## 2024-05-21 RX ADMIN — OXYCODONE HYDROCHLORIDE 5 MG: 5 TABLET ORAL at 11:05

## 2024-05-21 RX ADMIN — MIDAZOLAM 2 MG: 1 INJECTION INTRAMUSCULAR; INTRAVENOUS at 07:47

## 2024-05-21 RX ADMIN — ROCURONIUM BROMIDE 50 MG: 10 INJECTION INTRAVENOUS at 07:59

## 2024-05-21 RX ADMIN — SUGAMMADEX 100 MG: 100 INJECTION, SOLUTION INTRAVENOUS at 09:12

## 2024-05-21 RX ADMIN — CEFAZOLIN 2000 MG: 2 INJECTION, POWDER, FOR SOLUTION INTRAMUSCULAR; INTRAVENOUS at 08:05

## 2024-05-21 RX ADMIN — FENTANYL CITRATE 25 MCG: 50 INJECTION, SOLUTION INTRAMUSCULAR; INTRAVENOUS at 09:19

## 2024-05-21 RX ADMIN — DEXAMETHASONE SODIUM PHOSPHATE 4 MG: 4 INJECTION, SOLUTION INTRAMUSCULAR; INTRAVENOUS at 08:09

## 2024-05-21 RX ADMIN — ONDANSETRON 4 MG: 2 INJECTION INTRAMUSCULAR; INTRAVENOUS at 09:12

## 2024-05-21 RX ADMIN — SODIUM CHLORIDE, POTASSIUM CHLORIDE, SODIUM LACTATE AND CALCIUM CHLORIDE: 600; 310; 30; 20 INJECTION, SOLUTION INTRAVENOUS at 09:19

## 2024-05-21 RX ADMIN — LIDOCAINE HYDROCHLORIDE 100 MG: 20 INJECTION, SOLUTION INTRAVENOUS at 07:57

## 2024-05-21 RX ADMIN — SUGAMMADEX 100 MG: 100 INJECTION, SOLUTION INTRAVENOUS at 09:14

## 2024-05-21 RX ADMIN — FENTANYL CITRATE 25 MCG: 50 INJECTION, SOLUTION INTRAMUSCULAR; INTRAVENOUS at 09:17

## 2024-05-21 ASSESSMENT — PAIN - FUNCTIONAL ASSESSMENT
PAIN_FUNCTIONAL_ASSESSMENT: PREVENTS OR INTERFERES SOME ACTIVE ACTIVITIES AND ADLS
PAIN_FUNCTIONAL_ASSESSMENT: ACTIVITIES ARE NOT PREVENTED
PAIN_FUNCTIONAL_ASSESSMENT: 0-10
PAIN_FUNCTIONAL_ASSESSMENT: 0-10
PAIN_FUNCTIONAL_ASSESSMENT: ACTIVITIES ARE NOT PREVENTED
PAIN_FUNCTIONAL_ASSESSMENT: 0-10

## 2024-05-21 ASSESSMENT — PAIN SCALES - GENERAL
PAINLEVEL_OUTOF10: 4
PAINLEVEL_OUTOF10: 6
PAINLEVEL_OUTOF10: 0

## 2024-05-21 ASSESSMENT — ENCOUNTER SYMPTOMS
SORE THROAT: 0
EYE REDNESS: 0
BACK PAIN: 0
APNEA: 0
ANAL BLEEDING: 0
CHOKING: 0
PHOTOPHOBIA: 0
STRIDOR: 0
COLOR CHANGE: 0
ABDOMINAL PAIN: 1
RECTAL PAIN: 0
CONSTIPATION: 0
EYE ITCHING: 0

## 2024-05-21 ASSESSMENT — PAIN DESCRIPTION - DESCRIPTORS
DESCRIPTORS: ACHING;SHARP
DESCRIPTORS: SORE
DESCRIPTORS: ACHING;SHARP

## 2024-05-21 ASSESSMENT — PAIN DESCRIPTION - LOCATION
LOCATION: ABDOMEN
LOCATION: ABDOMEN

## 2024-05-21 ASSESSMENT — PAIN DESCRIPTION - ONSET: ONSET: ON-GOING

## 2024-05-21 ASSESSMENT — PAIN DESCRIPTION - PAIN TYPE: TYPE: SURGICAL PAIN

## 2024-05-21 ASSESSMENT — PAIN DESCRIPTION - ORIENTATION: ORIENTATION: MID

## 2024-05-21 ASSESSMENT — PAIN SCALES - WONG BAKER: WONGBAKER_NUMERICALRESPONSE: NO HURT

## 2024-05-21 ASSESSMENT — PAIN DESCRIPTION - FREQUENCY: FREQUENCY: CONTINUOUS

## 2024-05-21 NOTE — H&P
Abdominal:      General: There is no distension.      Palpations: Abdomen is soft. There is no mass.      Tenderness: There is no abdominal tenderness. There is no guarding or rebound.      Hernia: A hernia is present.       Musculoskeletal:         General: Normal range of motion.      Cervical back: Normal range of motion and neck supple.   Skin:     General: Skin is warm.   Neurological:      Mental Status: He is alert and oriented to person, place, and time.           ASSESSMENT:  No diagnosis found.        PLAN:  Treatment: will proceed with LIH repair robotically.  Patient counseled on risks, benefits, and alternatives of treatment plan at length. Patient states anunderstanding and willingness to proceed with plan.          Orders Placed This Encounter   Procedures    CBC with Auto Differential    Diet NPO Exceptions are: Sips of Water with Meds    Vital signs per unit routine    Verify surgical site confirmation documentation completed    Verify discontinuation of anticoagulants/antiplatelets unless otherwise specified by physician    Nursing communication- beta-blocker    Void on call to OR    Verify informed consent    Verify pre-procedure history and physical completed    Place intermittent pneumatic compression device    Full Code    Initiate Oxygen Therapy Protocol    EKG 12 Lead          Orders Placed This Encounter   Medications    lactated ringers IV soln infusion    sodium chloride flush 0.9 % injection 5-40 mL    sodium chloride flush 0.9 % injection 5-40 mL    0.9 % sodium chloride infusion    ceFAZolin (ANCEF) 2,000 mg in sterile water 20 mL IV syringe     Default Settings: Auto-dosed by Weight On Call to O.R x 1 dose  Auto-dosed: Pt Wt<119.9kg-2gm, >120kg-3gm     Order Specific Question:   Antimicrobial Indications     Answer:   Surgical Prophylaxis            Follow Up:  No follow-ups on file.      Dave Shabazz MD

## 2024-05-21 NOTE — DISCHARGE INSTRUCTIONS
Discharge Instructions for Abdominal Hernia   Dr Yajaira Paige PA-C  [phone]    RESUME ACTIVITY:      BATHING: OK to shower but no bath tub or submerging incision under water. You may shower beginning the second day after surgery.    Wound:  Keep wound dry and clean.  May shower as instructed above.    Dressing:  If you have a dressing over your belly button: You may remove your surgical dressing 2 days after surgery before you shower. Your incision is covered with glue that will fall off on its own with time.     DRIVING: No driving until off narcotic pain medications and walking comfortably    RETURN TO WORK: When cleared by your surgeon    WALKING:  As tolerated     STAIRS:  As tolerated    Diet    Some pain medicine can cause constipation . To avoid this problem:   Drink plenty of fluids.   Eat foods high in fiber , such as:   Whole grain cereals and breads   Fruits and vegetables   Legumes (eg, beans, lentils)   Physical Activity    Less than 10 pounds until cleared by your surgeon  Ask the doctor when you can return to normal activities.    Wear your abdominal binder as needed for comfort      In addition:   Ask the doctor when you will be able to return to work.   Do not drive unless your doctor has given you permission to do so.     Medications     Take your pain medications as instructed.  Resume your home medications as instructed.      Lifestyle Changes   You and your doctor will plan lifestyle changes that will aid in recovery. If you smoke, your doctor may recommend that you quit . Smoking can cause chronic cough , a risk factor for this condition.     Prevention   To prevent hernias from recurring:   Maintain a healthy weight .   Strengthen abdominal muscles.   Avoid heavy lifting.   Get treated for chronic conditions, like constipation, allergies, or chronic coughing.     Follow-up   The doctor will monitor this condition. You may need more exams. Go to all of your appointments.

## 2024-05-21 NOTE — OP NOTE
Operative Report    Patient ID:  Drake Montanez  [id number]  69 y.o.  1955      Pre-operative Diagnosis: Left Inguinal hernia     Post-operative Diagnosis: same    Procedure:  Robotic-Assisted left MATT inguinal hernia repair with mesh    Surgeon: ELIAS BAE MD    Findings:  same, small right recurrent was noted but since wasn't consented did not address it.     Estimated Blood Loss:  Minimal           Total IV Fluids: 500 ml            Complications:  None; patient tolerated the procedure well.           Disposition: PACU - hemodynamically stable.           Condition: stable    Implants:  * No implants in log *     Procedure Details:   The patient was seen again in the Holding Room. The risks, benefits, complications, treatment options, and expected outcomes were discussed with the patient. The possibilities of reaction to medication, pulmonary aspiration, perforation of viscus, bleeding, recurrent infection, the need for additional procedures, and development of a complication requiring transfusion or further operation were discussed with the patient and/or family. There was concurrence with the proposed plan, and informed consent was obtained.  The site of surgery was properly noted/marked. The patient was taken to the Operating Room and the procedure verified. A Time Out was held and the above information confirmed.     Indications: Symptomatic Left Inguinal hernia    Description of Procedure:  The patient was brought to the operating room, and the site of surgery was verified. The patient was then placed supine with the arms tucked at the sides. After obtaining adequate anesthesia, the patients abdomen was prepped and draped in a standard sterile fashion. The patient was placed in the Trendelenburg position. Using a 5-mm optical trocar, the right mid abdominal quadrant was entered without incident. Pneumoperitoneum was created with insufflation to 12 mmHg. Two additional 8-mm trocar and 8-mm trocar

## 2024-05-21 NOTE — PROGRESS NOTES
1235:  Pt discharged to home alert and oriented with 3/10 c/o lower abd discomfort.  ABD incisions x3 well approx with surgical glue in place, no bleeding or drainage noted.  Pt tolerating PO, VSS.

## 2024-05-21 NOTE — PROGRESS NOTES
0926- pt.arrived to pacu via cart from OR. Pt. Attached to monitor and alarms are on. Received report from [facility]. Pt. Is drowsy from anesthesia and has an oral airway in place. Pt. Wearing a simple mask at 8L. SR on the monitor. Pt. Has 3 incision sites that are clean dry and intact. IV infusing without any issues. SCDs are turned on.   0930- oral airway removed. Pt. Responds to verbal stimuli and able to follow commands. Pt. Falls back asleep.   1020- pt. Is resting with eyes closed but awakens easily with verbal stimuli. Pt. Is aox4. Pt. States abdomen is sore but tolerable at this time. Discussed pain interventions available. Pt. Is still drowsy from anesthesia. Pt. States would prefer oral pain medication at this time. Pt. Denies n/v. Pt. Has tolerated ice chips. Pt. Remains on RA sating 93-94%. SR on the monitor. Incision sites are clean dry and intact. Pt. Has been updated on plan of care and denies any other questions or concerns at this time.

## 2024-05-21 NOTE — BRIEF OP NOTE
Brief Postoperative Note      Patient: Drake Montanez  YOB: 1955  MRN: [id number]    Date of Procedure: 5/21/2024    Pre-Op Diagnosis Codes:     * Inguinal hernia [K40.90]    Post-Op Diagnosis: Same       Procedure(s):  HERNIA INGUINAL REPAIR LAPAROSCOPIC ROBOTIC    Surgeon(s):  Yajaira Garcia MD    Assistant:  First Assistant: Lindsay Earl  Resident: Dave Shabazz MD    Anesthesia: General    Estimated Blood Loss (mL): Minimal    Complications: None    Specimens:   * No specimens in log *    Implants:  * No implants in log *      Drains:   Urinary Catheter 05/21/24 2 Way (Active)       Findings:  Infection Present At Time Of Surgery (PATOS) (choose all levels that have infection present):  No infection present      Electronically signed by Dave Shabazz MD on 5/21/2024 at 9:11 AM

## 2024-05-22 ENCOUNTER — TELEPHONE (OUTPATIENT)
Dept: SURGERY | Age: 69
End: 2024-05-22

## 2024-05-22 LAB
EKG ATRIAL RATE: 71 BPM
EKG DIAGNOSIS: NORMAL
EKG P AXIS: 53 DEGREES
EKG P-R INTERVAL: 192 MS
EKG Q-T INTERVAL: 376 MS
EKG QRS DURATION: 92 MS
EKG QTC CALCULATION (BAZETT): 408 MS
EKG R AXIS: -15 DEGREES
EKG T AXIS: 15 DEGREES
EKG VENTRICULAR RATE: 71 BPM

## 2024-05-22 NOTE — TELEPHONE ENCOUNTER
Post-op Phone Call Follow-up  Dr Jose Montanez is 1 days s/p Sony Left Inguinal Hernia Repair.     I called the pt today to see how they were doing post-operatively.  The pt's pain is well controlled with current pain control regimen.   Pt's incisions are doing well. Denies erythema, swelling or drainage.   Pt is tolerating eating without N/V, and is having bowel movements.   I reviewed the post-operative instructions, addressed any concerns and answered the patient's questions.     I confirmed the patient's post-op appointment on 5/30/2024      Kee Stephens MA

## 2024-05-30 ENCOUNTER — OFFICE VISIT (OUTPATIENT)
Dept: SURGERY | Age: 69
End: 2024-05-30

## 2024-05-30 ENCOUNTER — HOSPITAL ENCOUNTER (OUTPATIENT)
Age: 69
Setting detail: SPECIMEN
Discharge: HOME | End: 2024-05-30
Payer: COMMERCIAL

## 2024-05-30 VITALS
HEART RATE: 74 BPM | HEIGHT: 71 IN | WEIGHT: 202.4 LBS | BODY MASS INDEX: 28.34 KG/M2 | OXYGEN SATURATION: 95 % | DIASTOLIC BLOOD PRESSURE: 64 MMHG | SYSTOLIC BLOOD PRESSURE: 104 MMHG

## 2024-05-30 DIAGNOSIS — R35.0 URINARY FREQUENCY: Primary | ICD-10-CM

## 2024-05-30 DIAGNOSIS — K40.90 NON-RECURRENT UNILATERAL INGUINAL HERNIA WITHOUT OBSTRUCTION OR GANGRENE: ICD-10-CM

## 2024-05-30 LAB
BILIRUBIN, URINE: NEGATIVE MG/DL
BLOOD, URINE: NEGATIVE
CLARITY: CLEAR
COLOR: YELLOW
COMMENT UA: NORMAL
GLUCOSE URINE: NEGATIVE MG/DL
KETONES, URINE: NEGATIVE MG/DL
LEUKOCYTE ESTERASE, URINE: NEGATIVE
NITRITE URINE, QUANTITATIVE: NEGATIVE
PH, URINE: 6.5 (ref 5–8)
PROTEIN UA: NEGATIVE MG/DL
SPECIFIC GRAVITY UA: 1.02 (ref 1–1.03)
UROBILINOGEN, URINE: 0.2 MG/DL (ref 0.2–1)

## 2024-05-30 PROCEDURE — 81003 URINALYSIS AUTO W/O SCOPE: CPT

## 2024-05-30 PROCEDURE — 99024 POSTOP FOLLOW-UP VISIT: CPT | Performed by: SURGERY

## 2024-05-30 NOTE — PROGRESS NOTES
Chief Complaint   Patient presents with    Post-Op Check     1st P/O - Sony IHR 5/21/2024 @ [facility]         SUBJECTIVE:  Patient here for post op visit.  Pain is minimal.  Wounds: minbruising and no discharge.    Past Surgical History:   Procedure Laterality Date    DENTAL SURGERY      Teeth Extracted In Past    HERNIA REPAIR Right 8/19/14    right inquinal hernia repair    HERNIA REPAIR Left 5/21/2024    HERNIA INGUINAL REPAIR LAPAROSCOPIC ROBOTIC performed by Yajaira Garcia MD at [facility] OR    KNEE ARTHROPLASTY Left 2000's     Past Medical History:   Diagnosis Date    HX OTHER MEDICAL     Primary Care Physician Is Dr. Praveen Dougherty    Teeth missing     Lower Right    Wears glasses     To Read     Family History   Problem Relation Age of Onset    Cancer Father         Unsure What Kind Of Cancer    Diabetes Sister      Social History     Socioeconomic History    Marital status:      Spouse name: Not on file    Number of children: Not on file    Years of education: Not on file    Highest education level: Not on file   Occupational History    Not on file   Tobacco Use    Smoking status: Never    Smokeless tobacco: Never   Vaping Use    Vaping Use: Never used   Substance and Sexual Activity    Alcohol use: No    Drug use: No    Sexual activity: Yes     Partners: Female   Other Topics Concern    Not on file   Social History Narrative    Not on file     Social Determinants of Health     Financial Resource Strain: Not on file   Food Insecurity: Not on file   Transportation Needs: Not on file   Physical Activity: Not on file   Stress: Not on file   Social Connections: Not on file   Intimate Partner Violence: Not on file   Housing Stability: Not on file       OBJECTIVE:   Physical Exam    Wound well healed without signs of active infection. Suture line intact. Abdomen soft, nontender, nondistended.       ASSESSMENT:  Patient doing well on this post operative check.  Wounds well healed.     1. Urinary frequency    2.

## 2024-06-13 ENCOUNTER — OFFICE VISIT (OUTPATIENT)
Dept: SURGERY | Age: 69
End: 2024-06-13
Payer: COMMERCIAL

## 2024-06-13 VITALS
HEIGHT: 71 IN | WEIGHT: 205.4 LBS | OXYGEN SATURATION: 97 % | BODY MASS INDEX: 28.76 KG/M2 | HEART RATE: 70 BPM | DIASTOLIC BLOOD PRESSURE: 76 MMHG | SYSTOLIC BLOOD PRESSURE: 128 MMHG

## 2024-06-13 DIAGNOSIS — Z48.89 ENCOUNTER FOR POSTOPERATIVE CARE: Primary | ICD-10-CM

## 2024-06-13 PROCEDURE — 36415 COLL VENOUS BLD VENIPUNCTURE: CPT | Performed by: PHYSICIAN ASSISTANT

## 2024-06-13 PROCEDURE — APPNB30 APP NON BILLABLE TIME 0-30 MINS: Performed by: PHYSICIAN ASSISTANT

## 2024-06-13 PROCEDURE — 99024 POSTOP FOLLOW-UP VISIT: CPT | Performed by: PHYSICIAN ASSISTANT

## 2024-06-13 RX ORDER — PANTOPRAZOLE SODIUM 40 MG/1
40 TABLET, DELAYED RELEASE ORAL
Qty: 60 TABLET | Refills: 0 | Status: SHIPPED | OUTPATIENT
Start: 2024-06-13

## 2024-06-13 NOTE — PROGRESS NOTES
Chief Complaint   Patient presents with    Post-Op Check     2nd P/O Sony Left Inguinal Hernia Repair @ [facility] 5/21/24         SUBJECTIVE:  Patient presents today for his 2nd post op visit following robotic assisted LIHR.  Pt reports that pain is intermittent and mild  Pt is  tolerating a regular diet. BMs are WNL.  Pt does report abd distention prior to eating that is uncomfortable. This improves with BM. Bowels are moving normally. Pt reports continued sore throat as well as frequent throat clearing, especially in the morning.     Incisions: well healed.     Past Surgical History:   Procedure Laterality Date    DENTAL SURGERY      Teeth Extracted In Past    HERNIA REPAIR Right 8/19/14    right inquinal hernia repair    HERNIA REPAIR Left 5/21/2024    HERNIA INGUINAL REPAIR LAPAROSCOPIC ROBOTIC performed by Yajaira Garcia MD at [facility] OR    KNEE ARTHROPLASTY Left 2000's     Past Medical History:   Diagnosis Date    HX OTHER MEDICAL     Primary Care Physician Is Dr. Praveen Dougherty    Teeth missing     Lower Right    Wears glasses     To Read     Family History   Problem Relation Age of Onset    Cancer Father         Unsure What Kind Of Cancer    Diabetes Sister      Social History     Socioeconomic History    Marital status:      Spouse name: Not on file    Number of children: Not on file    Years of education: Not on file    Highest education level: Not on file   Occupational History    Not on file   Tobacco Use    Smoking status: Never    Smokeless tobacco: Never   Vaping Use    Vaping Use: Never used   Substance and Sexual Activity    Alcohol use: No    Drug use: No    Sexual activity: Yes     Partners: Female   Other Topics Concern    Not on file   Social History Narrative    Not on file     Social Determinants of Health     Financial Resource Strain: Not on file   Food Insecurity: Not on file   Transportation Needs: Not on file   Physical Activity: Not on file   Stress: Not on file   Social Connections: Not

## 2024-06-14 LAB
ANION GAP SERPL CALCULATED.3IONS-SCNC: 11 MMOL/L (ref 3–16)
BUN SERPL-MCNC: 19 MG/DL (ref 7–20)
CALCIUM SERPL-MCNC: 9.9 MG/DL (ref 8.3–10.6)
CHLORIDE SERPL-SCNC: 101 MMOL/L (ref 99–110)
CO2 SERPL-SCNC: 26 MMOL/L (ref 21–32)
CREAT SERPL-MCNC: 0.9 MG/DL (ref 0.8–1.3)
DEPRECATED RDW RBC AUTO: 14 % (ref 12.4–15.4)
GFR SERPLBLD CREATININE-BSD FMLA CKD-EPI: >90 ML/MIN/{1.73_M2}
GLUCOSE SERPL-MCNC: 95 MG/DL (ref 70–99)
HCT VFR BLD AUTO: 38.8 % (ref 40.5–52.5)
HGB BLD-MCNC: 13.3 G/DL (ref 13.5–17.5)
MCH RBC QN AUTO: 32 PG (ref 26–34)
MCHC RBC AUTO-ENTMCNC: 34.2 G/DL (ref 31–36)
MCV RBC AUTO: 93.6 FL (ref 80–100)
PLATELET # BLD AUTO: 258 K/UL (ref 135–450)
PMV BLD AUTO: 8 FL (ref 5–10.5)
POTASSIUM SERPL-SCNC: 4.4 MMOL/L (ref 3.5–5.1)
RBC # BLD AUTO: 4.15 M/UL (ref 4.2–5.9)
SODIUM SERPL-SCNC: 138 MMOL/L (ref 136–145)
WBC # BLD AUTO: 5.7 K/UL (ref 4–11)

## 2024-06-27 ENCOUNTER — OFFICE VISIT (OUTPATIENT)
Dept: SURGERY | Age: 69
End: 2024-06-27

## 2024-06-27 VITALS
OXYGEN SATURATION: 96 % | SYSTOLIC BLOOD PRESSURE: 110 MMHG | HEART RATE: 71 BPM | BODY MASS INDEX: 28.77 KG/M2 | DIASTOLIC BLOOD PRESSURE: 72 MMHG | WEIGHT: 205.5 LBS | HEIGHT: 71 IN

## 2024-06-27 DIAGNOSIS — Z48.89 ENCOUNTER FOR POSTOPERATIVE CARE: Primary | ICD-10-CM

## 2024-06-27 PROCEDURE — APPNB30 APP NON BILLABLE TIME 0-30 MINS: Performed by: PHYSICIAN ASSISTANT

## 2024-06-27 PROCEDURE — 99024 POSTOP FOLLOW-UP VISIT: CPT | Performed by: PHYSICIAN ASSISTANT

## 2024-06-27 ASSESSMENT — PATIENT HEALTH QUESTIONNAIRE - PHQ9
2. FEELING DOWN, DEPRESSED OR HOPELESS: NOT AT ALL
SUM OF ALL RESPONSES TO PHQ QUESTIONS 1-9: 0
SUM OF ALL RESPONSES TO PHQ9 QUESTIONS 1 & 2: 0
1. LITTLE INTEREST OR PLEASURE IN DOING THINGS: NOT AT ALL
SUM OF ALL RESPONSES TO PHQ QUESTIONS 1-9: 0

## 2024-06-27 NOTE — PROGRESS NOTES
Chief Complaint   Patient presents with    Post-Op Check     3rd PO JOSE D LT ING Hernia repair @ [facility] 5/21/24         SUBJECTIVE:  Patient presents today for his 3rd post op visit following Robotic assisted left inguinal hernia repair.  Pt reports that pain is none.  Pt is  tolerating a regular diet. BMs are WNL.    Incisions: well healed.     Past Surgical History:   Procedure Laterality Date    DENTAL SURGERY      Teeth Extracted In Past    HERNIA REPAIR Right 8/19/14    right inquinal hernia repair    HERNIA REPAIR Left 5/21/2024    HERNIA INGUINAL REPAIR LAPAROSCOPIC ROBOTIC performed by Yajaira Garcia MD at [facility] OR    KNEE ARTHROPLASTY Left 2000's     Past Medical History:   Diagnosis Date    HX OTHER MEDICAL     Primary Care Physician Is Dr. Praveen Dougherty    Teeth missing     Lower Right    Wears glasses     To Read     Family History   Problem Relation Age of Onset    Cancer Father         Unsure What Kind Of Cancer    Diabetes Sister      Social History     Socioeconomic History    Marital status:      Spouse name: Not on file    Number of children: Not on file    Years of education: Not on file    Highest education level: Not on file   Occupational History    Not on file   Tobacco Use    Smoking status: Never    Smokeless tobacco: Never   Vaping Use    Vaping Use: Never used   Substance and Sexual Activity    Alcohol use: No    Drug use: No    Sexual activity: Yes     Partners: Female   Other Topics Concern    Not on file   Social History Narrative    Not on file     Social Determinants of Health     Financial Resource Strain: Not on file   Food Insecurity: Not on file   Transportation Needs: Not on file   Physical Activity: Not on file   Stress: Not on file   Social Connections: Not on file   Intimate Partner Violence: Not on file   Housing Stability: Not on file       OBJECTIVE:  Physical Exam  Constitutional:       Appearance: He is well-developed.   HENT:      Head: Normocephalic.   Eyes:

## 2024-07-02 LAB
REASON FOR REJECTION: NORMAL
REJECTED TEST: NORMAL

## 2024-07-08 RX ORDER — PANTOPRAZOLE SODIUM 40 MG/1
80 TABLET, DELAYED RELEASE ORAL
Qty: 180 TABLET | Refills: 1 | Status: SHIPPED | OUTPATIENT
Start: 2024-07-08

## 2025-04-01 NOTE — PROGRESS NOTES
Speech Therapy  Cancellation/No-show Note  Patient Name:  Drake Montanez  :  1955   Date:  2025  Cancels to Date: 4  No-shows to Date: 0    For today's appointment patient:  [x]  Cancelled  []  Rescheduled appointment  []  No-show     Reason given by patient:  []  Patient ill  []  Conflicting appointment  []  No transportation    []  Conflict with work  []  No reason given  [x]  Other:     Comments:  Pt has called, cancelled and rescheduled VLS and initial voice evaluation x4 d/t work schedule.  He stated he will call back to schedule when he is able to.     Electronically signed by:  Inga Miramontes MS, CCC-SLP, 2025, 3:46 PM

## 2025-04-03 ENCOUNTER — HOSPITAL ENCOUNTER (OUTPATIENT)
Dept: SPEECH THERAPY | Age: 70
Discharge: HOME | End: 2025-04-03

## 2025-06-18 ENCOUNTER — TELEPHONE (OUTPATIENT)
Dept: SURGERY | Age: 70
End: 2025-06-18

## 2025-06-18 NOTE — TELEPHONE ENCOUNTER
PT CALLED BACK AND SAID THAT HE HAS OTHER APPTS HE NEEDS TO TAKE CARE OF BEFORE SCHEDULING CONSULT. PT WILL CALL BACK WHEN HE IS READY.

## 2025-06-18 NOTE — TELEPHONE ENCOUNTER
LEFT MESSAGE FOR PT TO CALL BACK AND SCHEDULE CONSULT APPT FROM REFERRAL.    PP ANDOM 2024, [facility] CONSENT, REF FROM [facility]

## (undated) DEVICE — SUTURE ABSORBABLE BRAIDED 4-0 FS-2 18 IN VIO SLV VICRYL J392H

## (undated) DEVICE — SUTURE VICRYL SZ 1 L27IN ABSRB VLT L26MM CT-2 1/2 CIR J335H

## (undated) DEVICE — SUTURE STRATAFIX SZ 3-0 L20CM ABSRB VLT NDL SH-1 L22MM 1/2 SXPP1B453

## (undated) DEVICE — TIP COVER ACCESSORY

## (undated) DEVICE — PACK SURG LAP CHOLE

## (undated) DEVICE — TOTAL TRAY, DB, 100% SILI FOLEY, 16FR 10: Brand: MEDLINE

## (undated) DEVICE — SMS GOWN NONREINF LG: Brand: MEDLINE

## (undated) DEVICE — ADHESIVE SKIN CLSR 0.7ML TOP DERMBND ADV

## (undated) DEVICE — GLOVE SURG SZ 6 THK91MIL LTX FREE SYN POLYISOPRENE ANTI

## (undated) DEVICE — BANDAGE ADH W2XL4IN NITRL FAB STRP CURAD

## (undated) DEVICE — BLADELESS OBTURATOR: Brand: WECK VISTA

## (undated) DEVICE — NEEDLE HYPO 20GA L1.5IN YEL POLYPR HUB S STL REG BVL STR

## (undated) DEVICE — POSITIONER HD AD W4.5XH8XL9IN HIGHLY RESILIENT FOAM CMFRT

## (undated) DEVICE — Z DISC USE 2764362 SEAL ENDOSCP INSTR DIA5-8MM UNIV FOR CANN DA VINCI XI

## (undated) DEVICE — BLADE CLIPPER GEN PURP NS

## (undated) DEVICE — COLUMN DRAPE

## (undated) DEVICE — ARM DRAPE

## (undated) DEVICE — Z INACTIVE NO ACTIVE SUPPLIER APPLICATOR MEDICATED 26 CC TINT HI-LITE ORNG STRL CHLORAPREP

## (undated) DEVICE — GLOVE ORANGE PI 7 1/2   MSG9075

## (undated) DEVICE — EXCEL 10FT (3.05 M) INSUFFLATION TUBING SET WITH 0.1 MICRON FILTER: Brand: EXCEL

## (undated) DEVICE — TROCAR: Brand: KII FIOS FIRST ENTRY

## (undated) DEVICE — GOWN,SIRUS,POLYRNF,BRTHSLV,XLN/XL,20/CS: Brand: MEDLINE

## (undated) DEVICE — POSITIONER,HEAD,RING CUSHION,9IN,32CS: Brand: MEDLINE

## (undated) DEVICE — SOLUTION IV IRRIG WATER 1000ML POUR BRL 2F7114

## (undated) DEVICE — SYRINGE MED 20ML STD CLR PLAS LUERLOCK TIP N CTRL DISP

## (undated) DEVICE — 3M™ IOBAN™ 2 ANTIMICROBIAL INCISE DRAPE 6650EZ: Brand: IOBAN™ 2